# Patient Record
Sex: MALE | Race: ASIAN | Employment: FULL TIME | URBAN - METROPOLITAN AREA
[De-identification: names, ages, dates, MRNs, and addresses within clinical notes are randomized per-mention and may not be internally consistent; named-entity substitution may affect disease eponyms.]

---

## 2023-03-31 ENCOUNTER — OFFICE VISIT (OUTPATIENT)
Dept: PRIMARY CARE | Facility: CLINIC | Age: 38
End: 2023-03-31
Payer: COMMERCIAL

## 2023-03-31 ENCOUNTER — APPOINTMENT (OUTPATIENT)
Dept: LAB | Facility: LAB | Age: 38
End: 2023-03-31
Payer: COMMERCIAL

## 2023-03-31 VITALS
TEMPERATURE: 97.3 F | SYSTOLIC BLOOD PRESSURE: 105 MMHG | BODY MASS INDEX: 25.44 KG/M2 | OXYGEN SATURATION: 96 % | HEIGHT: 64 IN | HEART RATE: 68 BPM | WEIGHT: 149 LBS | DIASTOLIC BLOOD PRESSURE: 60 MMHG

## 2023-03-31 DIAGNOSIS — Z00.00 VISIT FOR PREVENTIVE HEALTH EXAMINATION: Primary | ICD-10-CM

## 2023-03-31 DIAGNOSIS — R07.89 ATYPICAL CHEST PAIN: ICD-10-CM

## 2023-03-31 DIAGNOSIS — G89.29 CHRONIC RIGHT-SIDED LOW BACK PAIN WITHOUT SCIATICA: ICD-10-CM

## 2023-03-31 DIAGNOSIS — M54.50 CHRONIC RIGHT-SIDED LOW BACK PAIN WITHOUT SCIATICA: ICD-10-CM

## 2023-03-31 PROCEDURE — 81001 URINALYSIS AUTO W/SCOPE: CPT

## 2023-03-31 PROCEDURE — 1036F TOBACCO NON-USER: CPT | Performed by: FAMILY MEDICINE

## 2023-03-31 PROCEDURE — 36415 COLL VENOUS BLD VENIPUNCTURE: CPT

## 2023-03-31 PROCEDURE — 99385 PREV VISIT NEW AGE 18-39: CPT | Performed by: FAMILY MEDICINE

## 2023-03-31 PROCEDURE — 80061 LIPID PANEL: CPT

## 2023-03-31 PROCEDURE — 99202 OFFICE O/P NEW SF 15 MIN: CPT | Performed by: FAMILY MEDICINE

## 2023-03-31 PROCEDURE — 80053 COMPREHEN METABOLIC PANEL: CPT

## 2023-03-31 PROCEDURE — 84443 ASSAY THYROID STIM HORMONE: CPT

## 2023-03-31 PROCEDURE — 85027 COMPLETE CBC AUTOMATED: CPT

## 2023-03-31 RX ORDER — PANTOPRAZOLE SODIUM 40 MG/1
40 TABLET, DELAYED RELEASE ORAL DAILY
Qty: 30 TABLET | Refills: 1 | Status: SHIPPED | OUTPATIENT
Start: 2023-03-31 | End: 2024-04-02 | Stop reason: ALTCHOICE

## 2023-03-31 NOTE — PROGRESS NOTES
Subjective   Patient ID: Pablo Gray is a 38 y.o. male who presents for Establish Care and Annual Exam.    Assessment/Plan     Problem List Items Addressed This Visit    None  Visit Diagnoses       Visit for preventive health examination    -  Primary    Relevant Orders    CBC    Comprehensive Metabolic Panel    Lipid Panel    TSH with reflex to Free T4 if abnormal    Urinalysis Microscopic Only    Atypical chest pain        Relevant Medications    pantoprazole (ProtoNix) 40 mg EC tablet    Chronic right-sided low back pain without sciatica            Discussed about diet exercise  Discussed about age-related immunization  Discussed about fasting lab work  Follow-up every year for physical  We will call patient with abnormal result  Come back early with any new signs and symptoms  Patient understood and agreed with plan.    HPI    38-year-old male new patient to me    Here for physical  No significant past medical or surgical history  No significant family history  Still complaining of left-sided chest pain on and off for long period of time patient had extensive work-up done back in [place] bleeding EKG no significant abnormality  Denies any reflux will consider PPI trial for couple of months advised to consider less spicy diet    Complaining of chronic right-sided on and off lower back pain discussed about exercise  Complaining of increased bowel movement after using Z-Piero 3 months ago with COVID infection  Advised to use over-the-counter probiotic yogurt    No smoking alcohol drug use  Patient is     No Known Allergies    Current Outpatient Medications   Medication Sig Dispense Refill    pantoprazole (ProtoNix) 40 mg EC tablet Take 1 tablet (40 mg) by mouth once daily. Do not crush, chew, or split. 30 tablet 1     No current facility-administered medications for this visit.       Objective   Visit Vitals  /60 (BP Location: Left arm, Patient Position: Sitting)   Pulse 68   Temp 36.3 °C (97.3 °F)  "  Ht 1.626 m (5' 4\")   Wt 67.6 kg (149 lb)   SpO2 96%   BMI 25.58 kg/m²   Smoking Status Never   BSA 1.75 m²     Physical Exam    Constitutional   General appearance: Alert and in no acute distress.   Head and Face   Head and face: Normal.     Palpation of the face and sinuses: Normal.    Eyes   Inspection of eyes: Sclera and conjunctiva were normal.    Pupil exam: Pupils were equal in size. Extraocular movements were intact.   Ears, Nose, Mouth, and Throat   Ears: Auricles: Normal.    Otoscopic examination: Tympanic membranes: Normal with no congestion and no discharge. Otic Canals: Normal without tenderness, congestion or discharge.    Hearing: Normal.     Nasal mucosa, septum, and turbinates: Normal without edema or erythema.    Lips, teeth, and gums: Normal.    Oropharynx: Normal with moist mucus membranes, no congestion. Tonsils: Normal no follicles.   Neck   Neck Exam: Appearance of the neck was normal. No neck masses observed.    Thyroid exam: Not enlarged and no palpable thyroid nodules.   Pulmonary   Respiratory assessment: No respiratory distress, normal respiratory rhythm and effort.    Auscultation of Lungs: Clear bilateral breath sounds.   Cardiovascular   Auscultation of heart: Apical pulse normal, heart rate and rhythm normal, normal S1 and S2, no murmurs and no pericardial rub.    Carotid arteries: Pulses normal with no bruits.    Exam for edema: No peripheral edema.   Chest   Chest: Normal A_P diameter, no pulsation, no intercostal withdrawing. Trachea central.   Abdomen   Abdominal Exam: No bruits, normal bowel sounds, soft, non-tender, no abdominal mass palpated.    Liver and Spleen exam: No hepato-splenomegaly.    Examination for hernias: Normal.    Lymphatic   Palpation of lymph nodes in neck: No cervical lymphadenopathy.   Musculoskeletal   Examination of gait: Normal.    Inspection of digits and nails: No clubbing or cyanosis of the fingernails.    Inspection/palpation of joints, bones and " muscles: No joint swelling. Normal movement of all extremities.    Range of Motion: Normal movement of all extremities.   Skin   Skin inspection: Normal skin color and pigmentation, normal skin turgor and no visible rash.   Neurologic   Cranial nerves: Nerves 2-12 were intact, no focal neuro defects.    Reflexes: Normal.     Sensation: Normal.     Coordination: Normal.    Psychiatric   Judgment and insight: Intact.    Orientation: Oriented to person, place, and time.    Recent and remote memory: Normal.     Mood and affect: Normal.     Genitourinary Declined.      There is no immunization history on file for this patient.    Review of Systems    No results found for any previous visit.       Radiology: Reviewed imaging in powerchart.  No results found.    No family history on file.  Social History     Socioeconomic History    Marital status:      Spouse name: None    Number of children: None    Years of education: None    Highest education level: None   Occupational History    None   Tobacco Use    Smoking status: Never    Smokeless tobacco: Never   Vaping Use    Vaping status: None   Substance and Sexual Activity    Alcohol use: Never    Drug use: Never    Sexual activity: None   Other Topics Concern    None   Social History Narrative    None     Social Determinants of Health     Financial Resource Strain: Not on file   Food Insecurity: Not on file   Transportation Needs: Not on file   Physical Activity: Not on file   Stress: Not on file   Social Connections: Not on file   Intimate Partner Violence: Not on file   Housing Stability: Not on file     History reviewed. No pertinent past medical history.  History reviewed. No pertinent surgical history.    Charting was completed using voice recognition technology and may include unintended errors.

## 2023-04-01 LAB
ALANINE AMINOTRANSFERASE (SGPT) (U/L) IN SER/PLAS: 17 U/L (ref 10–52)
ALBUMIN (G/DL) IN SER/PLAS: 4.3 G/DL (ref 3.4–5)
ALKALINE PHOSPHATASE (U/L) IN SER/PLAS: 61 U/L (ref 33–120)
ANION GAP IN SER/PLAS: 12 MMOL/L (ref 10–20)
ASPARTATE AMINOTRANSFERASE (SGOT) (U/L) IN SER/PLAS: 17 U/L (ref 9–39)
BILIRUBIN TOTAL (MG/DL) IN SER/PLAS: 0.6 MG/DL (ref 0–1.2)
CALCIUM (MG/DL) IN SER/PLAS: 9.3 MG/DL (ref 8.6–10.6)
CARBON DIOXIDE, TOTAL (MMOL/L) IN SER/PLAS: 28 MMOL/L (ref 21–32)
CHLORIDE (MMOL/L) IN SER/PLAS: 103 MMOL/L (ref 98–107)
CHOLESTEROL (MG/DL) IN SER/PLAS: 186 MG/DL (ref 0–199)
CHOLESTEROL IN HDL (MG/DL) IN SER/PLAS: 33 MG/DL
CHOLESTEROL/HDL RATIO: 5.6
CREATININE (MG/DL) IN SER/PLAS: 0.95 MG/DL (ref 0.5–1.3)
ERYTHROCYTE DISTRIBUTION WIDTH (RATIO) BY AUTOMATED COUNT: 13.4 % (ref 11.5–14.5)
ERYTHROCYTE MEAN CORPUSCULAR HEMOGLOBIN CONCENTRATION (G/DL) BY AUTOMATED: 30.9 G/DL (ref 32–36)
ERYTHROCYTE MEAN CORPUSCULAR VOLUME (FL) BY AUTOMATED COUNT: 83 FL (ref 80–100)
ERYTHROCYTES (10*6/UL) IN BLOOD BY AUTOMATED COUNT: 5.48 X10E12/L (ref 4.5–5.9)
GFR MALE: >90 ML/MIN/1.73M2
GLUCOSE (MG/DL) IN SER/PLAS: 84 MG/DL (ref 74–99)
HEMATOCRIT (%) IN BLOOD BY AUTOMATED COUNT: 45.6 % (ref 41–52)
HEMOGLOBIN (G/DL) IN BLOOD: 14.1 G/DL (ref 13.5–17.5)
LDL: 119 MG/DL (ref 0–99)
LEUKOCYTES (10*3/UL) IN BLOOD BY AUTOMATED COUNT: 5 X10E9/L (ref 4.4–11.3)
MUCUS, URINE: NORMAL /LPF
NRBC (PER 100 WBCS) BY AUTOMATED COUNT: 0 /100 WBC (ref 0–0)
PLATELETS (10*3/UL) IN BLOOD AUTOMATED COUNT: 169 X10E9/L (ref 150–450)
POTASSIUM (MMOL/L) IN SER/PLAS: 4.1 MMOL/L (ref 3.5–5.3)
PROTEIN TOTAL: 7.1 G/DL (ref 6.4–8.2)
RBC, URINE: 2 /HPF (ref 0–5)
SODIUM (MMOL/L) IN SER/PLAS: 139 MMOL/L (ref 136–145)
THYROTROPIN (MIU/L) IN SER/PLAS BY DETECTION LIMIT <= 0.05 MIU/L: 3.1 MIU/L (ref 0.44–3.98)
TRIGLYCERIDE (MG/DL) IN SER/PLAS: 168 MG/DL (ref 0–149)
UREA NITROGEN (MG/DL) IN SER/PLAS: 9 MG/DL (ref 6–23)
VLDL: 34 MG/DL (ref 0–40)
WBC, URINE: 5 /HPF (ref 0–5)

## 2023-04-24 DIAGNOSIS — R07.89 ATYPICAL CHEST PAIN: ICD-10-CM

## 2023-04-26 RX ORDER — PANTOPRAZOLE SODIUM 40 MG/1
TABLET, DELAYED RELEASE ORAL
Qty: 30 TABLET | Refills: 1 | OUTPATIENT
Start: 2023-04-26

## 2024-04-02 ENCOUNTER — OFFICE VISIT (OUTPATIENT)
Dept: PRIMARY CARE | Facility: CLINIC | Age: 39
End: 2024-04-02
Payer: COMMERCIAL

## 2024-04-02 VITALS
HEART RATE: 63 BPM | WEIGHT: 140 LBS | TEMPERATURE: 97.2 F | SYSTOLIC BLOOD PRESSURE: 102 MMHG | DIASTOLIC BLOOD PRESSURE: 60 MMHG | BODY MASS INDEX: 23.9 KG/M2 | OXYGEN SATURATION: 99 % | HEIGHT: 64 IN

## 2024-04-02 DIAGNOSIS — M25.561 ACUTE PAIN OF RIGHT KNEE: ICD-10-CM

## 2024-04-02 DIAGNOSIS — G44.229 CHRONIC TENSION-TYPE HEADACHE, NOT INTRACTABLE: Primary | ICD-10-CM

## 2024-04-02 PROCEDURE — 99213 OFFICE O/P EST LOW 20 MIN: CPT | Performed by: FAMILY MEDICINE

## 2024-04-02 PROCEDURE — 1036F TOBACCO NON-USER: CPT | Performed by: FAMILY MEDICINE

## 2024-04-02 NOTE — PROGRESS NOTES
Subjective   Patient ID: Pablo Gray is a 39 y.o. male who presents for Headache.    Assessment/Plan     Problem List Items Addressed This Visit       Chronic tension-type headache, not intractable - Primary    Relevant Orders    CT head wo IV contrast     Other Visit Diagnoses       Acute pain of right knee            Discussed about diet exercise  Discussed about age-related immunization  Discussed about fasting lab work  Follow-up every year for physical  We will call patient with abnormal result  Come back early with any new signs and symptoms  Patient understood and agreed with plan.    HPI    39-year-old complaining of on and off frequent headache particularly on the backside and on the sides going on and off since last couple of years  It comes randomly  Not associate with intermittent photophobia phonophobia nausea vomiting vision hearing issues no bowel bladder, incontinence no weakness speech swallow function okay      Patient is not taking any medication    Most likely simple headache versus stress headache    Continue conservative treatment neurology exam within normal limits  Consider CT head only if symptoms are worsening patient agrees    Also complaining of right knee pain on posterior aspect mostly muscle strain versus mild tendinitis  X-ray information provided  No significant past medical or surgical history  No significant family history  Still complaining of left-sided chest pain on and off for long period of time patient had extensive work-up done back in [place] bleeding EKG no significant abnormality  Denies any reflux will consider PPI trial for couple of months advised to consider less spicy diet    Complaining of chronic right-sided on and off lower back pain discussed about exercise  Complaining of increased bowel movement after using Z-Piero 3 months ago with COVID infection  Advised to use over-the-counter probiotic yogurt    No smoking alcohol drug use  Patient is     No  "Known Allergies    Current Outpatient Medications   Medication Sig Dispense Refill    pantoprazole (ProtoNix) 40 mg EC tablet Take 1 tablet (40 mg) by mouth once daily. Do not crush, chew, or split. 30 tablet 1     No current facility-administered medications for this visit.       Objective   Visit Vitals  /60 (BP Location: Left arm, Patient Position: Sitting)   Pulse 63   Temp 36.2 °C (97.2 °F)   Ht 1.626 m (5' 4\")   Wt 63.5 kg (140 lb)   SpO2 99%   BMI 24.03 kg/m²   Smoking Status Never   BSA 1.69 m²     Physical Exam    Constitutional:       General: He is not in acute distress.     Appearance: Normal appearance.   HENT:      Head: Normocephalic and atraumatic.      Nose: Nose normal.   Eyes:      Extraocular Movements: Extraocular movements intact.      Conjunctiva/sclera: Conjunctivae normal.   Cardiovascular:      Rate and Rhythm: Normal rate and regular rhythm.   Pulmonary:      Effort: Pulmonary effort is normal.      Breath sounds: Normal breath sounds.   Skin:     General: Skin is warm.   Neurological:      Mental Status: He is alert and oriented to person, place, and time.   Psychiatric:         Mood and Affect: Mood normal.         Behavior: Behavior normal.     Immunization History   Administered Date(s) Administered    Pfizer Purple Cap SARS-CoV-2 04/03/2021, 04/25/2021       Review of Systems    No visits with results within 4 Month(s) from this visit.   Latest known visit with results is:   Office Visit on 03/31/2023   Component Date Value Ref Range Status    WBC 03/31/2023 5.0  4.4 - 11.3 x10E9/L Final    nRBC 03/31/2023 0.0  0.0 - 0.0 /100 WBC Final    RBC 03/31/2023 5.48  4.50 - 5.90 x10E12/L Final    Hemoglobin 03/31/2023 14.1  13.5 - 17.5 g/dL Final    Hematocrit 03/31/2023 45.6  41.0 - 52.0 % Final    MCV 03/31/2023 83  80 - 100 fL Final    MCHC 03/31/2023 30.9 (L)  32.0 - 36.0 g/dL Final    Platelets 03/31/2023 169  150 - 450 x10E9/L Final    RDW 03/31/2023 13.4  11.5 - 14.5 % Final    " Glucose 03/31/2023 84  74 - 99 mg/dL Final    Sodium 03/31/2023 139  136 - 145 mmol/L Final    Potassium 03/31/2023 4.1  3.5 - 5.3 mmol/L Final    Chloride 03/31/2023 103  98 - 107 mmol/L Final    Bicarbonate 03/31/2023 28  21 - 32 mmol/L Final    Anion Gap 03/31/2023 12  10 - 20 mmol/L Final    Urea Nitrogen 03/31/2023 9  6 - 23 mg/dL Final    Creatinine 03/31/2023 0.95  0.50 - 1.30 mg/dL Final    GFR MALE 03/31/2023 >90  >90 mL/min/1.73m2 Final    Calcium 03/31/2023 9.3  8.6 - 10.6 mg/dL Final    Albumin 03/31/2023 4.3  3.4 - 5.0 g/dL Final    Alkaline Phosphatase 03/31/2023 61  33 - 120 U/L Final    Total Protein 03/31/2023 7.1  6.4 - 8.2 g/dL Final    AST 03/31/2023 17  9 - 39 U/L Final    Total Bilirubin 03/31/2023 0.6  0.0 - 1.2 mg/dL Final    ALT (SGPT) 03/31/2023 17  10 - 52 U/L Final    Cholesterol 03/31/2023 186  0 - 199 mg/dL Final    HDL 03/31/2023 33.0 (A)  mg/dL Final    Cholesterol/HDL Ratio 03/31/2023 5.6 (A)   Final    LDL 03/31/2023 119 (H)  0 - 99 mg/dL Final    VLDL 03/31/2023 34  0 - 40 mg/dL Final    Triglycerides 03/31/2023 168 (H)  0 - 149 mg/dL Final    TSH 03/31/2023 3.10  0.44 - 3.98 mIU/L Final    WBC, Urine 03/31/2023 5  0 - 5 /HPF Final    RBC, Urine 03/31/2023 2  0 - 5 /HPF Final    Mucus, Urine 03/31/2023 2+  /LPF Final       Radiology: Reviewed imaging in powerchart.  No results found.    No family history on file.  Social History     Socioeconomic History    Marital status:      Spouse name: None    Number of children: None    Years of education: None    Highest education level: None   Occupational History    None   Tobacco Use    Smoking status: Never    Smokeless tobacco: Never   Substance and Sexual Activity    Alcohol use: Never    Drug use: Never    Sexual activity: None   Other Topics Concern    None   Social History Narrative    None     Social Determinants of Health     Financial Resource Strain: Not on file   Food Insecurity: Not on file   Transportation Needs: Not  on file   Physical Activity: Not on file   Stress: Not on file   Social Connections: Not on file   Intimate Partner Violence: Not on file   Housing Stability: Not on file     History reviewed. No pertinent past medical history.  History reviewed. No pertinent surgical history.    Charting was completed using voice recognition technology and may include unintended errors.

## 2024-09-16 ENCOUNTER — APPOINTMENT (OUTPATIENT)
Dept: PRIMARY CARE | Facility: CLINIC | Age: 39
End: 2024-09-16
Payer: COMMERCIAL

## 2024-09-16 VITALS
OXYGEN SATURATION: 96 % | HEART RATE: 71 BPM | HEIGHT: 64 IN | DIASTOLIC BLOOD PRESSURE: 60 MMHG | TEMPERATURE: 98.1 F | BODY MASS INDEX: 24.07 KG/M2 | WEIGHT: 141 LBS | SYSTOLIC BLOOD PRESSURE: 109 MMHG

## 2024-09-16 DIAGNOSIS — E53.8 VITAMIN B12 DEFICIENCY: ICD-10-CM

## 2024-09-16 DIAGNOSIS — Z00.00 VISIT FOR PREVENTIVE HEALTH EXAMINATION: Primary | ICD-10-CM

## 2024-09-16 DIAGNOSIS — E55.9 VITAMIN D DEFICIENCY: ICD-10-CM

## 2024-09-16 PROCEDURE — 99395 PREV VISIT EST AGE 18-39: CPT | Performed by: FAMILY MEDICINE

## 2024-09-16 PROCEDURE — 3008F BODY MASS INDEX DOCD: CPT | Performed by: FAMILY MEDICINE

## 2024-09-16 PROCEDURE — 1036F TOBACCO NON-USER: CPT | Performed by: FAMILY MEDICINE

## 2024-09-16 RX ORDER — SUMATRIPTAN 50 MG/1
1 TABLET, FILM COATED ORAL AS NEEDED
COMMUNITY
Start: 2024-07-23

## 2024-09-16 RX ORDER — ERGOCALCIFEROL 1.25 MG/1
50000 CAPSULE ORAL WEEKLY
Qty: 4 CAPSULE | Refills: 5 | Status: SHIPPED | OUTPATIENT
Start: 2024-09-16 | End: 2025-03-03

## 2024-09-16 RX ORDER — AMITRIPTYLINE HYDROCHLORIDE 10 MG/1
1 TABLET, FILM COATED ORAL NIGHTLY
COMMUNITY
Start: 2024-07-23

## 2024-09-16 NOTE — PROGRESS NOTES
"Subjective   Patient ID: Pablo Gray is a 39 y.o. male who presents for Annual Exam (Pt had labs done a few wks ago in [place]/He declined flu shot).    Assessment/Plan     Problem List Items Addressed This Visit       Visit for preventive health examination - Primary     Discussed about diet exercise  Discussed about age-related immunization  Discussed about fasting lab work  Follow-up every year for physical  We will call patient with abnormal result  Come back early with any new signs and symptoms  Patient understood and agreed with plan.    HPI    39-year-old male here for physical    Headache not present not taking any medication  Had x-ray done no significant abnormality  Complaining of chronic right-sided on and off lower back pain discussed about exercise      No smoking alcohol drug use  Patient is     Patient had extensive lab work done in [place] including CBC CMP lipid TSH uric acid GGT ESR no significant abnormality    Vitamin D low  B12 low    Repeat vitamin D and B12 next year    Patient had EKG chest x-ray right knee x-ray done no significant abnormality  Right upper quadrant ultrasound no significant abnormality    No Known Allergies    Current Outpatient Medications   Medication Sig Dispense Refill    amitriptyline (Elavil) 10 mg tablet Take 1 tablet (10 mg) by mouth once daily at bedtime.      SUMAtriptan (Imitrex) 50 mg tablet Take 1 tablet (50 mg) by mouth if needed.       No current facility-administered medications for this visit.       Objective   Visit Vitals  /60 (BP Location: Left arm, Patient Position: Sitting)   Pulse 71   Temp 36.7 °C (98.1 °F)   Ht 1.626 m (5' 4\")   Wt 64 kg (141 lb)   SpO2 96%   BMI 24.20 kg/m²   Smoking Status Never   BSA 1.7 m²     Physical Exam    Constitutional:       General: He is not in acute distress.     Appearance: Normal appearance.   HENT:      Head: Normocephalic and atraumatic.      Nose: Nose normal.   Eyes:      Extraocular " Movements: Extraocular movements intact.      Conjunctiva/sclera: Conjunctivae normal.   Cardiovascular:      Rate and Rhythm: Normal rate and regular rhythm.   Pulmonary:      Effort: Pulmonary effort is normal.      Breath sounds: Normal breath sounds.   Skin:     General: Skin is warm.   Neurological:      Mental Status: He is alert and oriented to person, place, and time.   Psychiatric:         Mood and Affect: Mood normal.         Behavior: Behavior normal.     Immunization History   Administered Date(s) Administered    Flu vaccine (IIV4), preservative free *Check age/dose* 10/27/2020, 09/29/2021    Influenza, Unspecified 11/18/2020    Pfizer Purple Cap SARS-CoV-2 04/03/2021, 04/25/2021, 12/18/2021    Tdap vaccine, age 7 year and older (BOOSTRIX, ADACEL) 08/31/2015, 01/01/2018       Review of Systems    No visits with results within 4 Month(s) from this visit.   Latest known visit with results is:   Office Visit on 03/31/2023   Component Date Value Ref Range Status    WBC 03/31/2023 5.0  4.4 - 11.3 x10E9/L Final    nRBC 03/31/2023 0.0  0.0 - 0.0 /100 WBC Final    RBC 03/31/2023 5.48  4.50 - 5.90 x10E12/L Final    Hemoglobin 03/31/2023 14.1  13.5 - 17.5 g/dL Final    Hematocrit 03/31/2023 45.6  41.0 - 52.0 % Final    MCV 03/31/2023 83  80 - 100 fL Final    MCHC 03/31/2023 30.9 (L)  32.0 - 36.0 g/dL Final    Platelets 03/31/2023 169  150 - 450 x10E9/L Final    RDW 03/31/2023 13.4  11.5 - 14.5 % Final    Glucose 03/31/2023 84  74 - 99 mg/dL Final    Sodium 03/31/2023 139  136 - 145 mmol/L Final    Potassium 03/31/2023 4.1  3.5 - 5.3 mmol/L Final    Chloride 03/31/2023 103  98 - 107 mmol/L Final    Bicarbonate 03/31/2023 28  21 - 32 mmol/L Final    Anion Gap 03/31/2023 12  10 - 20 mmol/L Final    Urea Nitrogen 03/31/2023 9  6 - 23 mg/dL Final    Creatinine 03/31/2023 0.95  0.50 - 1.30 mg/dL Final    GFR MALE 03/31/2023 >90  >90 mL/min/1.73m2 Final    Calcium 03/31/2023 9.3  8.6 - 10.6 mg/dL Final    Albumin  03/31/2023 4.3  3.4 - 5.0 g/dL Final    Alkaline Phosphatase 03/31/2023 61  33 - 120 U/L Final    Total Protein 03/31/2023 7.1  6.4 - 8.2 g/dL Final    AST 03/31/2023 17  9 - 39 U/L Final    Total Bilirubin 03/31/2023 0.6  0.0 - 1.2 mg/dL Final    ALT (SGPT) 03/31/2023 17  10 - 52 U/L Final    Cholesterol 03/31/2023 186  0 - 199 mg/dL Final    HDL 03/31/2023 33.0 (A)  mg/dL Final    Cholesterol/HDL Ratio 03/31/2023 5.6 (A)   Final    LDL 03/31/2023 119 (H)  0 - 99 mg/dL Final    VLDL 03/31/2023 34  0 - 40 mg/dL Final    Triglycerides 03/31/2023 168 (H)  0 - 149 mg/dL Final    TSH 03/31/2023 3.10  0.44 - 3.98 mIU/L Final    WBC, Urine 03/31/2023 5  0 - 5 /HPF Final    RBC, Urine 03/31/2023 2  0 - 5 /HPF Final    Mucus, Urine 03/31/2023 2+  /LPF Final       Radiology: Reviewed imaging in powerchart.  No results found.    No family history on file.  Social History     Socioeconomic History    Marital status:    Tobacco Use    Smoking status: Never    Smokeless tobacco: Never   Substance and Sexual Activity    Alcohol use: Never    Drug use: Never     No past medical history on file.  No past surgical history on file.    Charting was completed using voice recognition technology and may include unintended errors.